# Patient Record
Sex: FEMALE | Race: WHITE | NOT HISPANIC OR LATINO | URBAN - METROPOLITAN AREA
[De-identification: names, ages, dates, MRNs, and addresses within clinical notes are randomized per-mention and may not be internally consistent; named-entity substitution may affect disease eponyms.]

---

## 2025-04-28 ENCOUNTER — OFFICE VISIT (OUTPATIENT)
Dept: URGENT CARE | Age: 65
End: 2025-04-28
Payer: MEDICARE

## 2025-04-28 VITALS
RESPIRATION RATE: 16 BRPM | TEMPERATURE: 97.3 F | OXYGEN SATURATION: 96 % | SYSTOLIC BLOOD PRESSURE: 130 MMHG | HEART RATE: 68 BPM | DIASTOLIC BLOOD PRESSURE: 77 MMHG

## 2025-04-28 DIAGNOSIS — R68.89 FLU-LIKE SYMPTOMS: ICD-10-CM

## 2025-04-28 DIAGNOSIS — J10.1 INFLUENZA A: Primary | ICD-10-CM

## 2025-04-28 LAB
POC CORONAVIRUS SARS-COV-2 PCR: NEGATIVE
POC HUMAN RHINOVIRUS PCR: NEGATIVE
POC INFLUENZA A VIRUS PCR: POSITIVE
POC INFLUENZA B VIRUS PCR: NEGATIVE
POC RESPIRATORY SYNCYTIAL VIRUS PCR: NEGATIVE

## 2025-04-28 PROCEDURE — 1159F MED LIST DOCD IN RCRD: CPT

## 2025-04-28 PROCEDURE — 1160F RVW MEDS BY RX/DR IN RCRD: CPT

## 2025-04-28 PROCEDURE — 1125F AMNT PAIN NOTED PAIN PRSNT: CPT

## 2025-04-28 PROCEDURE — 87631 RESP VIRUS 3-5 TARGETS: CPT

## 2025-04-28 PROCEDURE — 1036F TOBACCO NON-USER: CPT

## 2025-04-28 PROCEDURE — 99213 OFFICE O/P EST LOW 20 MIN: CPT

## 2025-04-28 RX ORDER — BUPROPION HYDROCHLORIDE 150 MG/1
150 TABLET ORAL DAILY
COMMUNITY

## 2025-04-28 ASSESSMENT — ENCOUNTER SYMPTOMS
RHINORRHEA: 1
FATIGUE: 1
DIARRHEA: 0
COUGH: 1
VOMITING: 0
SORE THROAT: 1
SINUS PAIN: 0
HEADACHES: 0
SHORTNESS OF BREATH: 0
FEVER: 0
WHEEZING: 0
CHILLS: 0
LOSS OF CONSCIOUSNESS: 0
ABDOMINAL PAIN: 0
SINUS PRESSURE: 1
NAUSEA: 0
MYALGIAS: 0

## 2025-04-28 ASSESSMENT — PAIN SCALES - GENERAL: PAINLEVEL_OUTOF10: 5

## 2025-04-28 NOTE — PATIENT INSTRUCTIONS
You have a viral upper respiratory infection (URI), commonly known as a cold. Antibiotics are not needed, as this will improve on its own.    Symptom Management  Rest and drink plenty of fluids.  Pain or fever relief: Take acetaminophen (Tylenol) or ibuprofen (Advil) as needed.  Nasal congestion: Use saline spray, Flonase, Claritin-D, or Afrin (for no more than 3 days).  Cough relief: Try honey (if over 1 year old), throat lozenges, or cough suppressants as needed.  Sore throat: Gargle with warm salt water or use throat sprays.  When to Seek Medical Care  Symptoms last more than 10 days or worsen.  High fever (>102°F) or difficulty breathing.  Persistent ear pain, facial pain, or sinus pressure.    Most viral infections improve in 7-10 days. If symptoms worsen, follow up with your provider.

## 2025-04-28 NOTE — PROGRESS NOTES
Subjective   Patient ID: Ann Smith is a 64 y.o. female. They present today with a chief complaint of Sore Throat (ST, sinus congestion/drainage X 4 days. ).    History of Present Illness  64 year old female presents with complaint of sinus congestion, ear pressure, sore throat, and cough. Symptoms started 4-5 days ago. Reports her son was sick with the Flu and home for [place] celebration. Did not get her flu shot this year. Has been taking OTC Advil cold and sinus with moderate relief.       Illness  Severity:  Moderate  Onset quality:  Sudden  Duration:  5 days  Timing:  Intermittent  Progression:  Waxing and waning  Chronicity:  New  Associated symptoms: congestion, cough, fatigue, rhinorrhea and sore throat    Associated symptoms: no abdominal pain, no chest pain, no diarrhea, no ear pain, no fever, no headaches, no loss of consciousness, no myalgias, no nausea, no rash, no shortness of breath, no vomiting and no wheezing        Past Medical History  Allergies as of 04/28/2025    (No Known Allergies)       Prescriptions Prior to Admission[1]     Medical History[2]    Surgical History[3]     reports that she has never smoked. She has never used smokeless tobacco. Alcohol use questions deferred to the physician. She reports that she does not use drugs.    Review of Systems  Review of Systems   Constitutional:  Positive for fatigue. Negative for chills and fever.   HENT:  Positive for congestion, postnasal drip, rhinorrhea, sinus pressure and sore throat. Negative for ear pain and sinus pain.    Respiratory:  Positive for cough. Negative for shortness of breath and wheezing.    Cardiovascular:  Negative for chest pain.   Gastrointestinal:  Negative for abdominal pain, diarrhea, nausea and vomiting.   Musculoskeletal:  Negative for myalgias.   Skin:  Negative for rash.   Neurological:  Negative for loss of consciousness and headaches.   All other systems reviewed and are negative.           Objective    Vitals:     04/28/25 1248   BP: 130/77   Pulse: 68   Resp: 16   Temp: 36.3 °C (97.3 °F)   SpO2: 96%     No LMP recorded. Patient is postmenopausal.    Physical Exam  Vitals and nursing note reviewed.   Constitutional:       General: She is not in acute distress.     Appearance: Normal appearance. She is normal weight. She is not ill-appearing or toxic-appearing.   HENT:      Head: Normocephalic.      Right Ear: Tympanic membrane, ear canal and external ear normal.      Left Ear: Tympanic membrane, ear canal and external ear normal.      Nose: Rhinorrhea present. No congestion.      Mouth/Throat:      Mouth: Mucous membranes are moist.      Pharynx: Oropharynx is clear. Posterior oropharyngeal erythema present. No oropharyngeal exudate.   Eyes:      Pupils: Pupils are equal, round, and reactive to light.   Cardiovascular:      Rate and Rhythm: Normal rate and regular rhythm.      Pulses: Normal pulses.      Heart sounds: Normal heart sounds. No murmur heard.  Pulmonary:      Effort: Pulmonary effort is normal. No respiratory distress.      Breath sounds: Normal breath sounds. No wheezing or rhonchi.   Musculoskeletal:         General: Normal range of motion.      Cervical back: Normal range of motion and neck supple.   Lymphadenopathy:      Cervical: No cervical adenopathy.   Skin:     General: Skin is warm.   Neurological:      Mental Status: She is alert and oriented to person, place, and time.   Psychiatric:         Mood and Affect: Mood normal.         Behavior: Behavior normal.         Procedures    Point of Care Test & Imaging Results from this visit  No results found for this visit on 04/28/25.   Imaging  No results found.    Cardiology, Vascular, and Other Imaging  No other imaging results found for the past 2 days      Diagnostic study results (if any) were reviewed by FRENCH Pagan.    Assessment/Plan   Allergies, medications, history, and pertinent labs/EKGs/Imaging reviewed by FRENCH Pagan.      Medical Decision Making  Influenza no Tamiflu- MDM- Signs, symptoms, history, and examination consistent with influenza/viral infection. No evidence of strep pharyngitis, sinusitis, AOM, sepsis, pneumonia, or other respiratory complications. Advise supportive measures. Tamiflu is NOT indicated. Patient is advised to return to clinic or present to ED if symptoms change or worsen. Otherwise follow with PCP. Patient verbalized understanding and agreeable with plan of care.     Orders and Diagnoses  Diagnoses and all orders for this visit:  Flu-like symptoms  -     POCT SPOTFIRE R/ST Panel Mini w/COVID ([facility]) manually resulted      Medical Admin Record      Patient disposition: Home    Electronically signed by FRENCH Pagan  1:00 PM           [1] (Not in a hospital admission)  [2]   Past Medical History:  Diagnosis Date    Other specified health status     No pertinent past medical history   [3] No past surgical history on file.